# Patient Record
Sex: MALE | Race: WHITE | NOT HISPANIC OR LATINO | URBAN - METROPOLITAN AREA
[De-identification: names, ages, dates, MRNs, and addresses within clinical notes are randomized per-mention and may not be internally consistent; named-entity substitution may affect disease eponyms.]

---

## 2019-05-18 ENCOUNTER — HOSPITAL ENCOUNTER (OUTPATIENT)
Dept: URGENT CARE | Facility: CLINIC | Age: 39
Discharge: HOME | End: 2019-05-18
Attending: NURSE PRACTITIONER

## 2019-05-18 LAB — URATE SERPL-MCNC: 5.2 MG/DL (ref 3.5–8.5)

## 2019-05-23 ENCOUNTER — OFFICE VISIT CONVERTED (OUTPATIENT)
Dept: UROLOGY | Facility: CLINIC | Age: 39
End: 2019-05-23
Attending: UROLOGY

## 2019-05-23 ENCOUNTER — HOSPITAL ENCOUNTER (OUTPATIENT)
Dept: UROLOGY | Facility: CLINIC | Age: 39
Discharge: HOME | End: 2019-05-23
Attending: UROLOGY

## 2019-05-23 LAB — PSA SERPL-MCNC: 5.15 NG/ML (ref 0–4)

## 2019-06-06 ENCOUNTER — OFFICE VISIT CONVERTED (OUTPATIENT)
Dept: UROLOGY | Facility: CLINIC | Age: 39
End: 2019-06-06
Attending: UROLOGY

## 2021-05-15 VITALS
SYSTOLIC BLOOD PRESSURE: 125 MMHG | HEIGHT: 72 IN | DIASTOLIC BLOOD PRESSURE: 86 MMHG | HEART RATE: 73 BPM | TEMPERATURE: 98.3 F

## 2021-05-15 VITALS
BODY MASS INDEX: 26.55 KG/M2 | TEMPERATURE: 98.8 F | HEIGHT: 72 IN | SYSTOLIC BLOOD PRESSURE: 112 MMHG | HEART RATE: 74 BPM | WEIGHT: 196 LBS | DIASTOLIC BLOOD PRESSURE: 62 MMHG

## 2024-03-14 ENCOUNTER — TELEMEDICINE (OUTPATIENT)
Dept: FAMILY MEDICINE CLINIC | Facility: TELEHEALTH | Age: 44
End: 2024-03-14
Payer: OTHER GOVERNMENT

## 2024-03-14 VITALS — TEMPERATURE: 96.4 F

## 2024-03-14 DIAGNOSIS — H60.503 ACUTE OTITIS EXTERNA OF BOTH EARS, UNSPECIFIED TYPE: Primary | ICD-10-CM

## 2024-03-14 NOTE — PROGRESS NOTES
You have chosen to receive care through a telehealth visit.  Do you consent to use a video/audio connection for your medical care today? Yes     HPI  Willam Sorensen is a 43 y.o. male  presents with complaint of 5  days of burning in the ear canals and draining which is a light yellow color. He has no fever, headache or sore throat. He is not taking anything for pain. He does have a h/o wearing ear buds. He states the pain is in the canal and not the TM. He reports no fever.     Review of Systems   Constitutional:  Negative for fever.   HENT:  Positive for ear discharge (light yellow bilaterally) and ear pain (bilateral ear pain in the canals.).    Respiratory: Negative.     Cardiovascular: Negative.    Gastrointestinal: Negative.    Neurological: Negative.    Hematological: Negative.    Psychiatric/Behavioral: Negative.         History reviewed. No pertinent past medical history.    History reviewed. No pertinent family history.    Social History     Socioeconomic History    Marital status:          Temp 96.4 °F (35.8 °C)     PHYSICAL EXAM  Physical Exam   Constitutional: He is oriented to person, place, and time. He appears well-developed and well-nourished. He does not have a sickly appearance. He does not appear ill. No distress.   HENT:   Head: Normocephalic and atraumatic.   Right Ear: Hearing normal. There is drainage. No swelling or tenderness. No mastoid tenderness. no right ear laceration(s) noted.  Left Ear: Hearing normal. There is drainage. No swelling or tenderness. No mastoid tenderness. no left ear laceration(s) noted.  Nose: Nose normal.   Mouth/Throat: Mouth/Lips are normal.No oropharyngeal exudate or tonsillar abscesses.   Ear canals red with cerumen blocking full viability or TM's.   Oral pharynx red with exudate.    Pulmonary/Chest: Effort normal.   Neurological: He is alert and oriented to person, place, and time.   Psychiatric: He has a normal mood and affect.   Vitals  reviewed.      Diagnoses and all orders for this visit:    1. Acute otitis externa of both ears, unspecified type (Primary)  -     neomycin-polymyxin-hydrocortisone (CORTISPORIN) 3.5-20140-4 otic solution; Administer 3 drops into both ears 3 (Three) Times a Day.  Dispense: 10 mL; Refill: 0  -     mupirocin (BACTROBAN) 2 % ointment; Apply 1 Application topically to the appropriate area as directed 3 (Three) Times a Day. Apply with a q tip to affected areas three times per day.  Dispense: 22 g; Refill: 0      If no improvement in 3-5 days follow up with PCP.     FOLLOW-UP  As discussed during visit with [facility], if symptoms worsen or fail to improve, follow-up with PCP/Urgent Care/Emergency Department.    Patient verbalizes understanding of medications, instructions for treatment and follow-up.    Julieta Calero, APRN  03/14/2024  16:03 EDT    The use of a video visit has been reviewed with the patient and verbal informed consent has been obtained. Myself and Willam Sorensen participated in this visit. The patient is located in  [facility], and I am located in [place], [place]. [employer] and [employer]  were utilized.